# Patient Record
Sex: FEMALE | Race: WHITE | NOT HISPANIC OR LATINO | Employment: OTHER | URBAN - METROPOLITAN AREA
[De-identification: names, ages, dates, MRNs, and addresses within clinical notes are randomized per-mention and may not be internally consistent; named-entity substitution may affect disease eponyms.]

---

## 2019-01-01 ENCOUNTER — APPOINTMENT (EMERGENCY)
Dept: RADIOLOGY | Facility: HOSPITAL | Age: 79
End: 2019-01-01
Payer: COMMERCIAL

## 2019-01-01 ENCOUNTER — HOSPITAL ENCOUNTER (EMERGENCY)
Facility: HOSPITAL | Age: 79
End: 2019-01-31
Attending: EMERGENCY MEDICINE
Payer: COMMERCIAL

## 2019-01-01 VITALS
OXYGEN SATURATION: 33 % | RESPIRATION RATE: 98 BRPM | DIASTOLIC BLOOD PRESSURE: 126 MMHG | SYSTOLIC BLOOD PRESSURE: 147 MMHG

## 2019-01-01 DIAGNOSIS — I46.9 CARDIAC ARREST (HCC): Primary | ICD-10-CM

## 2019-01-01 LAB
ALBUMIN SERPL BCP-MCNC: <1.5 G/DL (ref 3.5–5.7)
ALP SERPL-CCNC: 126 U/L (ref 55–165)
ALT SERPL W P-5'-P-CCNC: 209 U/L (ref 7–52)
ANION GAP SERPL CALCULATED.3IONS-SCNC: 18 MMOL/L (ref 4–13)
ANISOCYTOSIS BLD QL SMEAR: PRESENT
APTT PPP: 65 SECONDS (ref 26–38)
AST SERPL W P-5'-P-CCNC: 384 U/L (ref 13–39)
ATRIAL RATE: 76 BPM
BILIRUB SERPL-MCNC: 0.3 MG/DL (ref 0.2–1)
BUN SERPL-MCNC: 35 MG/DL (ref 7–25)
CALCIUM SERPL-MCNC: 10.3 MG/DL (ref 8.6–10.5)
CHLORIDE SERPL-SCNC: 118 MMOL/L (ref 98–107)
CO2 SERPL-SCNC: 9 MMOL/L (ref 21–31)
CREAT SERPL-MCNC: 1.68 MG/DL (ref 0.6–1.2)
ERYTHROCYTE [DISTWIDTH] IN BLOOD BY AUTOMATED COUNT: 20.4 % (ref 11.5–14.5)
ETHANOL SERPL-MCNC: <10 MG/DL
GFR SERPL CREATININE-BSD FRML MDRD: 29 ML/MIN/1.73SQ M
GLUCOSE SERPL-MCNC: 171 MG/DL (ref 65–99)
HCT VFR BLD AUTO: 25.4 % (ref 34.8–46.1)
HGB BLD-MCNC: 7 G/DL (ref 12–16)
INR PPP: 4.26 (ref 0.9–1.5)
LIPASE SERPL-CCNC: 16 U/L (ref 11–82)
LYMPHOCYTES # BLD AUTO: 47 % (ref 20–51)
LYMPHOCYTES # BLD AUTO: 5.41 THOUSAND/UL (ref 0.6–4.47)
MAGNESIUM SERPL-MCNC: 2 MG/DL (ref 1.9–2.7)
MCH RBC QN AUTO: 26.5 PG (ref 26–34)
MCHC RBC AUTO-ENTMCNC: 27.5 G/DL (ref 31–37)
MCV RBC AUTO: 97 FL (ref 81–99)
MONOCYTES # BLD AUTO: 0.35 THOUSAND/UL (ref 0–1.22)
MONOCYTES NFR BLD AUTO: 3 % (ref 4–12)
NEUTS BAND NFR BLD MANUAL: 11 % (ref 0–8)
NEUTS SEG # BLD: 5.75 THOUSAND/UL (ref 1.81–6.82)
NEUTS SEG NFR BLD AUTO: 39 % (ref 42–75)
NRBC BLD AUTO-RTO: 1 /100 WBCS
OVALOCYTES BLD QL SMEAR: PRESENT
PLATELET # BLD AUTO: 205 THOUSANDS/UL (ref 149–390)
PLATELET BLD QL SMEAR: ADEQUATE
PMV BLD AUTO: 7.4 FL (ref 8.6–11.7)
POLYCHROMASIA BLD QL SMEAR: PRESENT
POTASSIUM SERPL-SCNC: 4.7 MMOL/L (ref 3.5–5.5)
PROT SERPL-MCNC: <3 G/DL (ref 6.4–8.9)
PROTHROMBIN TIME: 50.2 SECONDS (ref 10.2–13)
QRS AXIS: 16 DEGREES
QRSD INTERVAL: 120 MS
QT INTERVAL: 406 MS
QTC INTERVAL: 418 MS
RBC # BLD AUTO: 2.63 MILLION/UL (ref 3.9–5.2)
RBC MORPH BLD: PRESENT
SCHISTOCYTES BLD QL SMEAR: PRESENT
SODIUM SERPL-SCNC: 145 MMOL/L (ref 134–143)
T WAVE AXIS: 218 DEGREES
TOTAL CELLS COUNTED SPEC: 100
TROPONIN I SERPL-MCNC: 0.11 NG/ML
VENTRICULAR RATE: 64 BPM
WBC # BLD AUTO: 11.5 THOUSAND/UL (ref 4.8–10.8)

## 2019-01-01 PROCEDURE — 83735 ASSAY OF MAGNESIUM: CPT | Performed by: EMERGENCY MEDICINE

## 2019-01-01 PROCEDURE — 96374 THER/PROPH/DIAG INJ IV PUSH: CPT

## 2019-01-01 PROCEDURE — 93010 ELECTROCARDIOGRAM REPORT: CPT | Performed by: INTERNAL MEDICINE

## 2019-01-01 PROCEDURE — 80320 DRUG SCREEN QUANTALCOHOLS: CPT | Performed by: EMERGENCY MEDICINE

## 2019-01-01 PROCEDURE — 96375 TX/PRO/DX INJ NEW DRUG ADDON: CPT

## 2019-01-01 PROCEDURE — 99285 EMERGENCY DEPT VISIT HI MDM: CPT

## 2019-01-01 PROCEDURE — 85027 COMPLETE CBC AUTOMATED: CPT | Performed by: EMERGENCY MEDICINE

## 2019-01-01 PROCEDURE — 36415 COLL VENOUS BLD VENIPUNCTURE: CPT | Performed by: EMERGENCY MEDICINE

## 2019-01-01 PROCEDURE — 80053 COMPREHEN METABOLIC PANEL: CPT | Performed by: EMERGENCY MEDICINE

## 2019-01-01 PROCEDURE — 92950 HEART/LUNG RESUSCITATION CPR: CPT

## 2019-01-01 PROCEDURE — 85730 THROMBOPLASTIN TIME PARTIAL: CPT | Performed by: EMERGENCY MEDICINE

## 2019-01-01 PROCEDURE — 83690 ASSAY OF LIPASE: CPT | Performed by: EMERGENCY MEDICINE

## 2019-01-01 PROCEDURE — 85610 PROTHROMBIN TIME: CPT | Performed by: EMERGENCY MEDICINE

## 2019-01-01 PROCEDURE — 84484 ASSAY OF TROPONIN QUANT: CPT | Performed by: EMERGENCY MEDICINE

## 2019-01-01 PROCEDURE — 93005 ELECTROCARDIOGRAM TRACING: CPT

## 2019-01-01 PROCEDURE — 85007 BL SMEAR W/DIFF WBC COUNT: CPT | Performed by: EMERGENCY MEDICINE

## 2019-01-01 RX ORDER — SODIUM BICARBONATE 84 MG/ML
INJECTION, SOLUTION INTRAVENOUS CODE/TRAUMA/SEDATION MEDICATION
Status: COMPLETED | OUTPATIENT
Start: 2019-01-01 | End: 2019-01-01

## 2019-01-01 RX ORDER — CALCIUM CHLORIDE 100 MG/ML
SYRINGE (ML) INTRAVENOUS CODE/TRAUMA/SEDATION MEDICATION
Status: COMPLETED | OUTPATIENT
Start: 2019-01-01 | End: 2019-01-01

## 2019-01-01 RX ADMIN — CALCIUM CHLORIDE 1 G: 100 INJECTION INTRAVENOUS at 12:55

## 2019-01-01 RX ADMIN — EPINEPHRINE 1 MG: 0.1 INJECTION, SOLUTION ENDOTRACHEAL; INTRACARDIAC; INTRAVENOUS at 12:56

## 2019-01-01 RX ADMIN — EPINEPHRINE 1 MG: 0.1 INJECTION, SOLUTION ENDOTRACHEAL; INTRACARDIAC; INTRAVENOUS at 12:51

## 2019-01-01 RX ADMIN — SODIUM BICARBONATE 50 MEQ: 84 INJECTION, SOLUTION INTRAVENOUS at 12:55

## 2019-01-31 NOTE — ED PROVIDER NOTES
History  Chief Complaint   Patient presents with    Cardiac Arrest     pt was transferring to commode at home , had large loose BM and and became unresponsive  From home, details about past/recent medical history unknown, collapsed at home witnessed by family, unclear if immediate CPR but first responders soon on scene and found w/o pulse in full cardio-pulmonary arrest, called Paramedics who intubated and chest compression ongoing en-route w/epi given and patient had ROSC just prior to arrival to ED  None       No past medical history on file  Past Surgical History:   Procedure Laterality Date    APPENDECTOMY      EYE SURGERY         Family History   Problem Relation Age of Onset    Hypertension Mother     Breast cancer Sister      I have reviewed and agree with the history as documented  Social History   Substance Use Topics    Smoking status: Never Smoker    Smokeless tobacco: Never Used      Comment: no passive smoke exposure    Alcohol use Not on file        Review of Systems   Unable to perform ROS: Acuity of condition       Physical Exam  Physical Exam   Constitutional: She appears well-developed and well-nourished  Unresponsive, intubated   HENT:   Head: Normocephalic and atraumatic  Eyes:   Pupils 4mm and poorly reactive; moderate conjunctival pallor present   Neck: Normal range of motion  No tracheal deviation present  Cardiovascular:   Weak pulse present, diminished capillary refill   Pulmonary/Chest:   Intubated, 7 0 ETT 21cm at lips; has equal breath sounds bilaterally w/ventilation through BVM   Abdominal: She exhibits no distension and no mass  Musculoskeletal: Normal range of motion  Left arm PICC present; right leg IO line present    Neurological:   Unresponsive; GCS=3t   Skin: Skin is warm  Capillary refill takes more than 3 seconds  There is pallor         Vital Signs  ED Triage Vitals   Temp Pulse Respirations Blood Pressure SpO2   -- 01/31/19 1251 01/31/19 1251 01/31/19 1306 01/31/19 1251    (!) 145 (!) 53 (!) 147/126 (!) 69 %      Temp src Heart Rate Source Patient Position - Orthostatic VS BP Location FiO2 (%)   -- -- -- -- --             Pain Score       --                  Vitals:    01/31/19 1318 01/31/19 1320 01/31/19 1321 01/31/19 1324   BP:       Pulse: (!) 0 (!) 0 (!) 0 (!) 0       Visual Acuity      ED Medications  Medications   sodium chloride 0 9 % bolus 1,000 mL (0 mL Intravenous Stopped 1/31/19 1339)    EMS REPLENISHMENT MED (not administered)   EPINEPHrine (ADRENALIN) injection (1 mg Intravenous Given 1/31/19 1256)   sodium bicarbonate 50 mEq/50 mL injection (50 mEq Intravenous Given 1/31/19 1255)   calcium chloride 1 g/10 mL injection (1 g Intravenous Given 1/31/19 1255)       Diagnostic Studies  Results Reviewed     Procedure Component Value Units Date/Time    Comprehensive metabolic panel [[id number]]  (Abnormal) Collected:  01/31/19 1315    Lab Status:  Final result Specimen:  Blood from Arm, Left Updated:  01/31/19 1351     Sodium 145 (H) mmol/L      Potassium 4 7 mmol/L      Chloride 118 (H) mmol/L      CO2 9 (LL) mmol/L      ANION GAP 18 (H) mmol/L      BUN 35 (H) mg/dL      Creatinine 1 68 (H) mg/dL      Glucose 171 (H) mg/dL      Calcium 10 3 mg/dL       (H) U/L       (H) U/L      Alkaline Phosphatase 126 U/L      Total Protein <3 0 (L) g/dL      Albumin <1 5 (L) g/dL      Total Bilirubin 0 30 mg/dL      eGFR 29 ml/min/1 73sq m     Narrative:         National Kid[employer] Disease Education Program recommendations are as follows:  GFR calculation is accurate only with a steady state creatinine  Chronic Kidney disease less than 60 ml/min/1 73 sq  meters  Kidney failure less than 15 ml/min/1 73 sq  meters      Magnesium [[id number]]  (Normal) Collected:  01/31/19 1315    Lab Status:  Final result Specimen:  Blood from Arm, Left Updated:  01/31/19 1348     Magnesium 2 0 mg/dL     Lipase [[id number]]  (Normal) Collected:  01/31/19 131 Lab Status:  Final result Specimen:  Blood from Arm, Left Updated:  01/31/19 1347     Lipase 16 u/L     Troponin I [[id number]]  (Abnormal) Collected:  01/31/19 1315    Lab Status:  Final result Specimen:  Blood from Arm, Left Updated:  01/31/19 1347     Troponin I 0 11 (H) ng/mL     Ethanol [[id number]]  (Normal) Collected:  01/31/19 1315    Lab Status:  Final result Specimen:  Blood from Arm, Left Updated:  01/31/19 1346     Ethanol Lvl <10 mg/dL     APTT [[id number]]  (Abnormal) Collected:  01/31/19 1315    Lab Status:  Final result Specimen:  Blood from Arm, Left Updated:  01/31/19 1339     PTT 65 (H) seconds     Protime-INR [[id number]]  (Abnormal) Collected:  01/31/19 1315    Lab Status:  Final result Specimen:  Blood from Arm, Left Updated:  01/31/19 1339     Protime 50 2 (H) seconds      INR 4 26 (H)    CBC and differential [[id number]]  (Abnormal) Collected:  01/31/19 1315    Lab Status:  Final result Specimen:  Blood from Arm, Left Updated:  01/31/19 1334     WBC 11 50 (H) Thousand/uL      RBC 2 63 (L) Million/uL      Hemoglobin 7 0 (L) g/dL      Hematocrit 25 4 (L) %      MCV 97 fL      MCH 26 5 pg      MCHC 27 5 (L) g/dL      RDW 20 4 (H) %      MPV 7 4 (L) fL      Platelets 538 Thousands/uL      nRBC 1 /100 WBCs                  No orders to display              Procedures  Procedures       Phone Contacts  ED Phone Contact    ED Course  ED Course as of Jan 31 1847   Thu Jan 31, 2019   1313 Patient's son Saji Keating called ED, notes that his sister is POA and that from his understanding his mother would not want heroic measures; also informed that his mother has metastatic cancer and "blood clot in right lung" and was just discharge from [facility]    1324 Lost pulse again, PEA on monitor, now with blood in ETT; patient's daughter Annabel Alcantar [employer]) and family in ED, discussed gravity of situation and little hope for any recovery and if maintained pulse likely would be chronically vent dependent possibly w/anoxic brain injury (plain language) used and overall futility of continuing efforts; daughter Carmina Lawson and family brought to bedside and it was agreed at  to stop efforts so patient pronounced dead    12 ME in ED, death certificate signed, no autopsy planned                                MDM  Number of Diagnoses or Management Options  Diagnosis management comments: Initial Impression: s/p witnessed arrest at home, PEA, per EMS has DNR signed but family wanted full code, had ROSC in the field after intubation/CPR/epinephrine      Disposition  Final diagnoses:   Cardiac arrest [facility])     Time reflects when diagnosis was documented in both MDM as applicable and the Disposition within this note     Time User Action Codes Description Comment    2019  6:46 PM Erasto Pérez Add [I46 9] Cardiac arrest [facility])       ED Disposition     ED Disposition Condition Date/Time Comment      Thu 2019  6:46 PM To carol      Follow-up Information    None       Date, Time and Cause of Death    Date of Death:  19  Time of Death:   1:24 PM  Preliminary Cause of Death:  Cardiac arrest       There are no discharge medications for this patient  No discharge procedures on file      ED Provider  Electronically Signed by           Akosua Aviles MD  19 Vasyl Waller MD  [phone]